# Patient Record
Sex: FEMALE | Race: WHITE
[De-identification: names, ages, dates, MRNs, and addresses within clinical notes are randomized per-mention and may not be internally consistent; named-entity substitution may affect disease eponyms.]

---

## 2017-08-11 ENCOUNTER — HOSPITAL ENCOUNTER (EMERGENCY)
Dept: HOSPITAL 47 - EC | Age: 37
LOS: 1 days | Discharge: HOME | End: 2017-08-12
Payer: COMMERCIAL

## 2017-08-11 VITALS — TEMPERATURE: 97.6 F

## 2017-08-11 VITALS — SYSTOLIC BLOOD PRESSURE: 114 MMHG | RESPIRATION RATE: 16 BRPM | DIASTOLIC BLOOD PRESSURE: 71 MMHG

## 2017-08-11 VITALS — HEART RATE: 68 BPM

## 2017-08-11 DIAGNOSIS — N83.201: Primary | ICD-10-CM

## 2017-08-11 DIAGNOSIS — Z91.040: ICD-10-CM

## 2017-08-11 DIAGNOSIS — Z90.49: ICD-10-CM

## 2017-08-11 DIAGNOSIS — Z88.0: ICD-10-CM

## 2017-08-11 DIAGNOSIS — Z88.1: ICD-10-CM

## 2017-08-11 LAB
ALP SERPL-CCNC: 65 U/L (ref 38–126)
ALT SERPL-CCNC: 44 U/L (ref 9–52)
AMYLASE SERPL-CCNC: 45 U/L (ref 30–110)
ANION GAP SERPL CALC-SCNC: 12 MMOL/L
AST SERPL-CCNC: 30 U/L (ref 14–36)
BASOPHILS # BLD AUTO: 0.1 K/UL (ref 0–0.2)
BASOPHILS NFR BLD AUTO: 1 %
BUN SERPL-SCNC: 7 MG/DL (ref 7–17)
CALCIUM SPEC-MCNC: 9.7 MG/DL (ref 8.4–10.2)
CH: 30.4
CHCM: 34.6
CHLORIDE SERPL-SCNC: 105 MMOL/L (ref 98–107)
CO2 SERPL-SCNC: 23 MMOL/L (ref 22–30)
EOSINOPHIL # BLD AUTO: 0.2 K/UL (ref 0–0.7)
EOSINOPHIL NFR BLD AUTO: 2 %
ERYTHROCYTE [DISTWIDTH] IN BLOOD BY AUTOMATED COUNT: 4.94 M/UL (ref 3.8–5.4)
ERYTHROCYTE [DISTWIDTH] IN BLOOD: 13.3 % (ref 11.5–15.5)
GLUCOSE SERPL-MCNC: 91 MG/DL (ref 74–99)
HCT VFR BLD AUTO: 43.6 % (ref 34–46)
HDW: 2.57
HGB BLD-MCNC: 14.5 GM/DL (ref 11.4–16)
LUC NFR BLD AUTO: 2 %
LYMPHOCYTES # SPEC AUTO: 3.2 K/UL (ref 1–4.8)
LYMPHOCYTES NFR SPEC AUTO: 38 %
MCH RBC QN AUTO: 29.3 PG (ref 25–35)
MCHC RBC AUTO-ENTMCNC: 33.2 G/DL (ref 31–37)
MCV RBC AUTO: 88.3 FL (ref 80–100)
MONOCYTES # BLD AUTO: 0.4 K/UL (ref 0–1)
MONOCYTES NFR BLD AUTO: 5 %
NEUTROPHILS # BLD AUTO: 4.3 K/UL (ref 1.3–7.7)
NEUTROPHILS NFR BLD AUTO: 52 %
NON-AFRICAN AMERICAN GFR(MDRD): >60
PH UR: 6.5 [PH] (ref 5–8)
POTASSIUM SERPL-SCNC: 3.7 MMOL/L (ref 3.5–5.1)
PROT SERPL-MCNC: 7.5 G/DL (ref 6.3–8.2)
SODIUM SERPL-SCNC: 140 MMOL/L (ref 137–145)
SP GR UR: 1 (ref 1–1.03)
UA BILLING (MACRO VS. MICRO): (no result)
UROBILINOGEN UR QL STRIP: <2 MG/DL (ref ?–2)
WBC # BLD AUTO: 0.17 10*3/UL
WBC # BLD AUTO: 8.3 K/UL (ref 3.8–10.6)
WBC (PEROX): 8.4

## 2017-08-11 PROCEDURE — 76830 TRANSVAGINAL US NON-OB: CPT

## 2017-08-11 PROCEDURE — 85025 COMPLETE CBC W/AUTO DIFF WBC: CPT

## 2017-08-11 PROCEDURE — 74177 CT ABD & PELVIS W/CONTRAST: CPT

## 2017-08-11 PROCEDURE — 99285 EMERGENCY DEPT VISIT HI MDM: CPT

## 2017-08-11 PROCEDURE — 93975 VASCULAR STUDY: CPT

## 2017-08-11 PROCEDURE — 83690 ASSAY OF LIPASE: CPT

## 2017-08-11 PROCEDURE — 96375 TX/PRO/DX INJ NEW DRUG ADDON: CPT

## 2017-08-11 PROCEDURE — 81003 URINALYSIS AUTO W/O SCOPE: CPT

## 2017-08-11 PROCEDURE — 82150 ASSAY OF AMYLASE: CPT

## 2017-08-11 PROCEDURE — 81025 URINE PREGNANCY TEST: CPT

## 2017-08-11 PROCEDURE — 96361 HYDRATE IV INFUSION ADD-ON: CPT

## 2017-08-11 PROCEDURE — 36415 COLL VENOUS BLD VENIPUNCTURE: CPT

## 2017-08-11 PROCEDURE — 96374 THER/PROPH/DIAG INJ IV PUSH: CPT

## 2017-08-11 PROCEDURE — 80053 COMPREHEN METABOLIC PANEL: CPT

## 2017-08-11 NOTE — CT
EXAM:

  CT Abdomen and Pelvis With Intravenous Contrast

 

CLINICAL HISTORY:

  Reason: Midline epigastric to pelvic pain

 

TECHNIQUE:

  Axial computed tomography images of the abdomen and pelvis with 

intravenous contrast.  CTDI is 37.60 mGy and DLP is 1573.40 mGy-cm.  This 

CT exam was performed using one or more of the following dose reduction 

techniques: automated exposure control, adjustment of the mA and/or kV 

according to patient size, and/or use of iterative reconstruction 

technique.

 

COMPARISON:

  None

 

FINDINGS:

  Liver: Tiny hypodensity in the liver is too small to definitively 

characterize. 

 

  Spleen: Normal. No focal lesion. 

 

  Gallbladder: Status post cholecystectomy with postcholecystectomy 

ductal ectasia.

 

  Pancreas: Normal. No mass.

 

  Adrenal glands: Normal. No mass. 

 

  Kidneys: Normal. No hydronephrosis or stone. No mass. 

 

  Bowel: Normal appendix. No bowel obstruction or inflammation.  

Nondilated fluid-filled loops of small bowel are nonspecific.

 

  Urinary bladder: Normal. No wall thickening or mass. 

 

  Reproductive organs: Small probable cyst in the right ovary measuring 3.

4 x 3.6 by 3.4 cm.

 

  Muscles: No mass.

 

  Subcutaneous tissues: Normal. 

 

  Peritoneal space: Trace fluid in the cul-de-sac. 

 

  Lymph nodes: Small mesenteric lymph nodes are nonspecific but may be 

reactive.

 

  Vessels: Normal. No aneurysm or dissection.

 

  Bones: Normal. No acute fracture or bony lesion. 

 

  Lung bases: Mild right basilar atelectasis.

 

IMPRESSION:   

  1.  Nondilated fluid-filled loops of small bowel are nonspecific.  

Enteritis cannot be excluded.  No but bowel obstruction or inflammation.

 

  2.  Normal appendix.

 

  3.  Probable small right ovarian cyst measuring up to 3.6 cm.  This 

could be further evaluated with ultrasound if clinically indicated.  

Trace free fluid in the cul-de-sac may be physiologic.

## 2017-08-11 NOTE — ED
Abdominal Pain HPI





- General


Chief Complaint: Abdominal Pain


Stated Complaint: Pelvic and back pain


Time Seen by Provider: 08/11/17 20:37


Source: patient, RN notes reviewed, old records reviewed


Mode of arrival: ambulatory


Limitations: no limitations





- History of Present Illness


Initial Comments: 





Is a 36-year-old female presenting to emergency Department chief complaint of 

right lower quadrant abdominal pain for the past day and half.  Patient reports 

it started last night.  She thought she recently had a urinary tract infection.

  She went to an urgent care after work today and she was told that her urine 

was clear and she needed to come to the emergency department.  She denies any 

fever.  She does have a history of colitis and IBS which is managed by diet.  

She reports that she had her gallbladder removed in 2004 by Dr. murphy.  She 

denies any nausea or vomiting, fevers or chills, or changes in bowel movements.

  She states that she is tender over her entire abdomen and that the pain is 

worse whenever she has to move.





- Related Data


 Home Medications











 Medication  Instructions  Recorded  Confirmed


 


Albuterol Inhaler [Ventolin Hfa 2 puff INHALATION RT-QID PRN 06/26/16 08/11/17





Inhaler]   


 


Albuterol Nebulized [Ventolin 2.5 mg INHALATION RT-QID PRN 06/26/16 08/11/17





Nebulized]   


 


Naproxen Sodium [Aleve] 220 mg PO BID PRN 08/11/17 08/11/17








 Previous Rx's











 Medication  Instructions  Recorded


 


Acetaminophen-Codeine 300-30mg 1 tab PO Q4H PRN #20 tablet 08/12/17





[Tylenol #3]  


 


Ibuprofen [Motrin] 600 mg PO Q8HR PRN #20 tab 08/12/17











 Allergies











Allergy/AdvReac Type Severity Reaction Status Date / Time


 


latex Allergy  Rash/Hives Verified 08/11/17 21:08


 


clindamycin HCl AdvReac  Rash/Hives/INCREASED Verified 08/11/17 21:08





[From Cleocin]   BP  


 


clindamycin palmitate HCl AdvReac  Rash/Hives/INCREASED Verified 08/11/17 21:08





[From Cleocin]   BP  


 


clindamycin phosphate AdvReac  Rash/Hives/INCREASED Verified 08/11/17 21:08





[From Cleocin]   BP  


 


Penicillins AdvReac  Rash/Hives/INCREASED Verified 08/11/17 21:08





   BP  














Review of Systems


ROS Statement: 


Those systems with pertinent positive or pertinent negative responses have been 

documented in the HPI.





ROS Other: All systems not noted in ROS Statement are negative.





Past Medical History


Past Medical History: Asthma, GERD/Reflux, Hyperlipidemia


Additional Past Medical History / Comment(s): MIGRAINES, IBS, ULCERATIVE COLITIS


History of Any Multi-Drug Resistant Organisms: None Reported


Past Surgical History: Adenoidectomy, Cholecystectomy, Orthopedic Surgery


Additional Past Surgical History / Comment(s): SPINAL FUSION,


Past Psychological History: Depression


Smoking Status: Never smoker


Past Alcohol Use History: None Reported


Past Drug Use History: None Reported





General Exam





- General Exam Comments


Initial Comments: 





This is a 36-year-old female.  Patient does not appear to be in any acute 

distress.


Limitations: no limitations


General appearance: alert, in no apparent distress


Head exam: Present: atraumatic, normocephalic, normal inspection


Eye exam: Present: normal appearance, PERRL, EOMI.  Absent: scleral icterus, 

conjunctival injection, periorbital swelling


ENT exam: Present: normal exam, mucous membranes moist


Neck exam: Present: normal inspection.  Absent: tenderness, meningismus, 

lymphadenopathy


Respiratory exam: Present: normal lung sounds bilaterally.  Absent: respiratory 

distress, wheezes, rales, rhonchi, stridor


Cardiovascular Exam: Present: regular rate, normal rhythm, normal heart sounds.

  Absent: systolic murmur, diastolic murmur, rubs, gallop, clicks


GI/Abdominal exam: Present: soft, tenderness (Severe right lower quadrant 

tenderness.), normal bowel sounds.  Absent: distended, guarding, rebound, rigid


Extremities exam: Present: normal inspection, full ROM, normal capillary 

refill.  Absent: tenderness, pedal edema, joint swelling, calf tenderness


Back exam: Present: normal inspection


Neurological exam: Present: alert, oriented X3, CN II-XII intact


Psychiatric exam: Present: normal affect, normal mood


Skin exam: Present: warm, dry, intact, normal color.  Absent: rash





Course


 Vital Signs











  08/11/17 08/11/17 08/11/17





  18:45 19:45 20:45


 


Temperature 97.6 F  


 


Pulse Rate 70 76 70


 


Respiratory 15 16 16





Rate   


 


Blood Pressure 132/75 115/81 112/68


 


O2 Sat by Pulse 100 100 99





Oximetry   














  08/11/17 08/11/17





  22:45 23:45


 


Temperature  


 


Pulse Rate 68 68


 


Respiratory 18 16





Rate  


 


Blood Pressure 108/55 114/71


 


O2 Sat by Pulse 68 L 98





Oximetry  














Medical Decision Making





- Medical Decision Making


Is a 36-year-old female presenting to emergency Department chief complaint of 

right lower quadrant abdominal pain for the past day and half.  Patient reports 

it started last night.  She thought she recently had a urinary tract infection.

  She went to an urgent care after work today and she was told that her urine 

was clear and she needed to come to the emergency department.  She denies any 

fever.  She does have a history of colitis and IBS which is managed by diet.  

Patient's lab work was reviewed and is negative for any acute process.  Patient 

received a CT abdomen and pelvis which showed evidence of a 3.6 cm right 

ovarian cyst, normal appendix.  There is mild free fluid within the cul-de-sac. 


Patient was reevaluated and still in pain. Patient given transvaginal US. No 

evidence of ovarian torsion, but there is the right ovarian cyst with free 

fluid noted. Patient pain is likely related to ruptured ovarian cyst. Patient 

informed of these results. Discussed follow up with PCP and patient will be 

discharged with pain medication. Return parameters discussed. 





- Lab Data


Result diagrams: 


 08/11/17 21:50





 08/11/17 21:50


 Lab Results











  08/11/17 08/11/17 08/11/17 Range/Units





  21:00 21:00 21:50 


 


WBC     (3.8-10.6)  k/uL


 


RBC     (3.80-5.40)  m/uL


 


Hgb     (11.4-16.0)  gm/dL


 


Hct     (34.0-46.0)  %


 


MCV     (80.0-100.0)  fL


 


MCH     (25.0-35.0)  pg


 


MCHC     (31.0-37.0)  g/dL


 


RDW     (11.5-15.5)  %


 


Plt Count     (150-450)  k/uL


 


Neutrophils %     %


 


Lymphocytes %     %


 


Monocytes %     %


 


Eosinophils %     %


 


Basophils %     %


 


Neutrophils #     (1.3-7.7)  k/uL


 


Lymphocytes #     (1.0-4.8)  k/uL


 


Monocytes #     (0-1.0)  k/uL


 


Eosinophils #     (0-0.7)  k/uL


 


Basophils #     (0-0.2)  k/uL


 


Sodium    140  (137-145)  mmol/L


 


Potassium    3.7  (3.5-5.1)  mmol/L


 


Chloride    105  ()  mmol/L


 


Carbon Dioxide    23  (22-30)  mmol/L


 


Anion Gap    12  mmol/L


 


BUN    7  (7-17)  mg/dL


 


Creatinine    0.60  (0.52-1.04)  mg/dL


 


Est GFR (MDRD) Af Amer    >60  (>60 ml/min/1.73 sqM)  


 


Est GFR (MDRD) Non-Af    >60  (>60 ml/min/1.73 sqM)  


 


Glucose    91  (74-99)  mg/dL


 


Calcium    9.7  (8.4-10.2)  mg/dL


 


Total Bilirubin    0.6  (0.2-1.3)  mg/dL


 


AST    30  (14-36)  U/L


 


ALT    44  (9-52)  U/L


 


Alkaline Phosphatase    65  ()  U/L


 


Total Protein    7.5  (6.3-8.2)  g/dL


 


Albumin    4.7  (3.5-5.0)  g/dL


 


Amylase    45  ()  U/L


 


Lipase    81  ()  U/L


 


Urine Color  Colorless    


 


Urine Appearance  Clear    (Clear)  


 


Urine pH  6.5    (5.0-8.0)  


 


Ur Specific Gravity  1.003    (1.001-1.035)  


 


Urine Protein  Negative    (Negative)  


 


Urine Glucose (UA)  Negative    (Negative)  


 


Urine Ketones  Negative    (Negative)  


 


Urine Blood  Negative    (Negative)  


 


Urine Nitrite  Negative    (Negative)  


 


Urine Bilirubin  Negative    (Negative)  


 


Urine Urobilinogen  <2.0    (<2.0)  mg/dL


 


Ur Leukocyte Esterase  Negative    (Negative)  


 


Urine HCG, Qual   Not Detected   (Not Detectd)  














  08/11/17 Range/Units





  21:50 


 


WBC  8.3  (3.8-10.6)  k/uL


 


RBC  4.94  (3.80-5.40)  m/uL


 


Hgb  14.5  (11.4-16.0)  gm/dL


 


Hct  43.6  (34.0-46.0)  %


 


MCV  88.3  (80.0-100.0)  fL


 


MCH  29.3  (25.0-35.0)  pg


 


MCHC  33.2  (31.0-37.0)  g/dL


 


RDW  13.3  (11.5-15.5)  %


 


Plt Count  250  (150-450)  k/uL


 


Neutrophils %  52  %


 


Lymphocytes %  38  %


 


Monocytes %  5  %


 


Eosinophils %  2  %


 


Basophils %  1  %


 


Neutrophils #  4.3  (1.3-7.7)  k/uL


 


Lymphocytes #  3.2  (1.0-4.8)  k/uL


 


Monocytes #  0.4  (0-1.0)  k/uL


 


Eosinophils #  0.2  (0-0.7)  k/uL


 


Basophils #  0.1  (0-0.2)  k/uL


 


Sodium   (137-145)  mmol/L


 


Potassium   (3.5-5.1)  mmol/L


 


Chloride   ()  mmol/L


 


Carbon Dioxide   (22-30)  mmol/L


 


Anion Gap   mmol/L


 


BUN   (7-17)  mg/dL


 


Creatinine   (0.52-1.04)  mg/dL


 


Est GFR (MDRD) Af Amer   (>60 ml/min/1.73 sqM)  


 


Est GFR (MDRD) Non-Af   (>60 ml/min/1.73 sqM)  


 


Glucose   (74-99)  mg/dL


 


Calcium   (8.4-10.2)  mg/dL


 


Total Bilirubin   (0.2-1.3)  mg/dL


 


AST   (14-36)  U/L


 


ALT   (9-52)  U/L


 


Alkaline Phosphatase   ()  U/L


 


Total Protein   (6.3-8.2)  g/dL


 


Albumin   (3.5-5.0)  g/dL


 


Amylase   ()  U/L


 


Lipase   ()  U/L


 


Urine Color   


 


Urine Appearance   (Clear)  


 


Urine pH   (5.0-8.0)  


 


Ur Specific Gravity   (1.001-1.035)  


 


Urine Protein   (Negative)  


 


Urine Glucose (UA)   (Negative)  


 


Urine Ketones   (Negative)  


 


Urine Blood   (Negative)  


 


Urine Nitrite   (Negative)  


 


Urine Bilirubin   (Negative)  


 


Urine Urobilinogen   (<2.0)  mg/dL


 


Ur Leukocyte Esterase   (Negative)  


 


Urine HCG, Qual   (Not Detectd)  














- Radiology Data


Radiology results: report reviewed


Nondilated fluid-filled loops of small bowel are nonspecific.  Enteritis cannot 

be occluded.  No evidence of bowel obstruction or inflammation.  Normal 

appendix.  Probable small right ovarian cyst measuring up to 3.6 cm.  3 further 

evaluated with ultrasound clinically indicated.  Trace fluid within the cul-de-

sac may be physiologic.





 US shows Cystic structure of the right ovary measuring 4 cm containing thin 

septations probable mild internal echoes which may represent hemorrhagic cyst.  

No evidence of torsion.  Normal uterus and left ovary.  Small amount of free 

fluid within the cul-de-sac.





Disposition


Clinical Impression: 


 Right ovarian cyst





Disposition: HOME SELF-CARE


Condition: Good


Instructions:  Ovarian Cyst (ED)


Additional Instructions: 


Is advised to follow-up with primary care provider.  Return to the emergency 

department if any alarming signs or symptoms occur.  Take pain medications as 

directed.


Prescriptions: 


Acetaminophen-Codeine 300-30mg [Tylenol #3] 1 tab PO Q4H PRN #20 tablet


 PRN Reason: Pain


Ibuprofen [Motrin] 600 mg PO Q8HR PRN #20 tab


 PRN Reason: Pain


Referrals: 


Kit Lujan MD [Primary Care Provider] - 1-2 days


Time of Disposition: 01:02

## 2017-08-12 NOTE — US
EXAM:

  US Pelvis Complete, Transabdominal

  US Pelvis, Transvaginal

 

CLINICAL HISTORY:

  Reason: Pain

 

TECHNIQUE:

  Real-time transabdominal and transvaginal pelvic ultrasound (complete) 

with image documentation.  Transvaginal imaging was used for better 

evaluation of the endometrium and adnexa.

 

COMPARISON:

  CT abdomen/pelvis on 8/11/2017

 

FINDINGS:

  Uterus: Measures 9.5 x 6.0 x 5.2 cm. Normal appearance. Endometrium 

measures 11 mm. 

 

  Right ovary: Measures 5.2 x 3.9 x 4.0 cm. Cystic structure in the right 

ovary containing thin septations and probable mild internal echoes 

measures approximately 3.8 x 4.0 x 3.3 cm and may represent a hemorrhagic 

cyst.  Normal color Doppler flow. 

 

  Left ovary: Measures 2.8 x 1.8 x 1.6 cm. Normal appearance with normal 

color Doppler flow. 

 

  Other: Small amount of free fluid in the cul-de-sac and right adnexa 

may be physiologic.

 

IMPRESSION:   

  1.  Cystic structure in the right ovary measuring up to 4 cm contains 

thin septations and probable mild internal echoes and may represent a 

hemorrhagic cyst.  No evidence of ovarian torsion.

 

  2.  Normal uterus and left ovary.

 

  3.  Small amount of free fluid in the cul-de-sac and right adnexa.

## 2017-10-06 ENCOUNTER — HOSPITAL ENCOUNTER (OUTPATIENT)
Dept: HOSPITAL 47 - RADUSWWP | Age: 37
Discharge: HOME | End: 2017-10-06
Payer: COMMERCIAL

## 2017-10-06 DIAGNOSIS — N83.201: Primary | ICD-10-CM

## 2017-10-06 PROCEDURE — 76830 TRANSVAGINAL US NON-OB: CPT

## 2017-10-06 NOTE — US
EXAMINATION TYPE: US transvaginal

 

DATE OF EXAM: 10/6/2017

 

COMPARISON: August 12, 2017

 

CLINICAL HISTORY: N83.20 previous ovarian cyst right side. F/U right ovarian cyst, pelvic pain

 

TECHNIQUE:  Transvaginal (TV)

 

Date of LMP:  09/25/2017

 

EXAM MEASUREMENTS:

 

Uterus:  9.4 x 5.5 x 5.9 cm

Endometrial Stripe: 1.1 cm

Right Ovary:  4.2 x 3.1 x 4.0 cm

Left Ovary:  2.2 x 1.3 x 1.9 cm

 

 

 

1. Uterus:  Anteverted   Heterogeneous, "bulky" in appearance

2. Endometrium:  wnl

3. Right Ovary:  Cyst with septation= 3.5 x 2.5 x 3.2 cm, different in appearance from previous

4. Left Ovary:  wnl

5. Bilateral Adnexa:  wnl

6. Posterior cul-de-sac:  Small amount of free fluid present 

 

 

 

IMPRESSION: 

1. I cannot exclude small leiomyomatous change given heterogeneity of the uterine myometrium.

2. Septated cyst right ovary. No evidence for solid lesion.

3. Free fluid within the pelvis.

## 2018-01-07 ENCOUNTER — HOSPITAL ENCOUNTER (EMERGENCY)
Dept: HOSPITAL 47 - EC | Age: 38
Discharge: HOME | End: 2018-01-07
Payer: COMMERCIAL

## 2018-01-07 VITALS — HEART RATE: 79 BPM | SYSTOLIC BLOOD PRESSURE: 94 MMHG | TEMPERATURE: 99.1 F | DIASTOLIC BLOOD PRESSURE: 49 MMHG

## 2018-01-07 VITALS — RESPIRATION RATE: 18 BRPM

## 2018-01-07 DIAGNOSIS — Z91.040: ICD-10-CM

## 2018-01-07 DIAGNOSIS — Z90.49: ICD-10-CM

## 2018-01-07 DIAGNOSIS — Z88.1: ICD-10-CM

## 2018-01-07 DIAGNOSIS — K52.9: Primary | ICD-10-CM

## 2018-01-07 DIAGNOSIS — Z88.0: ICD-10-CM

## 2018-01-07 LAB
ALBUMIN SERPL-MCNC: 4.4 G/DL (ref 3.5–5)
ALP SERPL-CCNC: 72 U/L (ref 38–126)
ALT SERPL-CCNC: 46 U/L (ref 9–52)
AMYLASE SERPL-CCNC: 50 U/L (ref 30–110)
ANION GAP SERPL CALC-SCNC: 12 MMOL/L
AST SERPL-CCNC: 28 U/L (ref 14–36)
BASOPHILS # BLD AUTO: 0 K/UL (ref 0–0.2)
BASOPHILS NFR BLD AUTO: 0 %
BUN SERPL-SCNC: 12 MG/DL (ref 7–17)
CALCIUM SPEC-MCNC: 9.6 MG/DL (ref 8.4–10.2)
CHLORIDE SERPL-SCNC: 101 MMOL/L (ref 98–107)
CO2 SERPL-SCNC: 26 MMOL/L (ref 22–30)
EOSINOPHIL # BLD AUTO: 0.1 K/UL (ref 0–0.7)
EOSINOPHIL NFR BLD AUTO: 1 %
ERYTHROCYTE [DISTWIDTH] IN BLOOD BY AUTOMATED COUNT: 4.81 M/UL (ref 3.8–5.4)
ERYTHROCYTE [DISTWIDTH] IN BLOOD: 13.4 % (ref 11.5–15.5)
GLUCOSE SERPL-MCNC: 149 MG/DL (ref 74–99)
HCT VFR BLD AUTO: 42.5 % (ref 34–46)
HGB BLD-MCNC: 13.6 GM/DL (ref 11.4–16)
LIPASE SERPL-CCNC: 73 U/L (ref 23–300)
LYMPHOCYTES # SPEC AUTO: 0.6 K/UL (ref 1–4.8)
LYMPHOCYTES NFR SPEC AUTO: 9 %
MCH RBC QN AUTO: 28.3 PG (ref 25–35)
MCHC RBC AUTO-ENTMCNC: 32.1 G/DL (ref 31–37)
MCV RBC AUTO: 88.3 FL (ref 80–100)
MONOCYTES # BLD AUTO: 0.3 K/UL (ref 0–1)
MONOCYTES NFR BLD AUTO: 4 %
NEUTROPHILS # BLD AUTO: 5.8 K/UL (ref 1.3–7.7)
NEUTROPHILS NFR BLD AUTO: 86 %
PH UR: 6 [PH] (ref 5–8)
PLATELET # BLD AUTO: 250 K/UL (ref 150–450)
POTASSIUM SERPL-SCNC: 3.9 MMOL/L (ref 3.5–5.1)
PROT SERPL-MCNC: 7.2 G/DL (ref 6.3–8.2)
PROT UR QL: (no result)
RBC UR QL: 5 /HPF (ref 0–5)
SODIUM SERPL-SCNC: 139 MMOL/L (ref 137–145)
SP GR UR: 1.02 (ref 1–1.03)
SQUAMOUS UR QL AUTO: 2 /HPF (ref 0–4)
UROBILINOGEN UR QL STRIP: <2 MG/DL (ref ?–2)
WBC # BLD AUTO: 6.8 K/UL (ref 3.8–10.6)
WBC #/AREA URNS HPF: 1 /HPF (ref 0–5)

## 2018-01-07 PROCEDURE — 96361 HYDRATE IV INFUSION ADD-ON: CPT

## 2018-01-07 PROCEDURE — 96375 TX/PRO/DX INJ NEW DRUG ADDON: CPT

## 2018-01-07 PROCEDURE — 81001 URINALYSIS AUTO W/SCOPE: CPT

## 2018-01-07 PROCEDURE — 74177 CT ABD & PELVIS W/CONTRAST: CPT

## 2018-01-07 PROCEDURE — 82150 ASSAY OF AMYLASE: CPT

## 2018-01-07 PROCEDURE — 36415 COLL VENOUS BLD VENIPUNCTURE: CPT

## 2018-01-07 PROCEDURE — 80053 COMPREHEN METABOLIC PANEL: CPT

## 2018-01-07 PROCEDURE — 83690 ASSAY OF LIPASE: CPT

## 2018-01-07 PROCEDURE — 83605 ASSAY OF LACTIC ACID: CPT

## 2018-01-07 PROCEDURE — 96374 THER/PROPH/DIAG INJ IV PUSH: CPT

## 2018-01-07 PROCEDURE — 81025 URINE PREGNANCY TEST: CPT

## 2018-01-07 PROCEDURE — 96376 TX/PRO/DX INJ SAME DRUG ADON: CPT

## 2018-01-07 PROCEDURE — 99284 EMERGENCY DEPT VISIT MOD MDM: CPT

## 2018-01-07 PROCEDURE — 85025 COMPLETE CBC W/AUTO DIFF WBC: CPT

## 2018-01-07 PROCEDURE — 87040 BLOOD CULTURE FOR BACTERIA: CPT

## 2018-01-07 NOTE — ED
Abdominal Pain HPI





- General


Chief Complaint: Abdominal Pain


Stated Complaint: Vomiting/abdominal Pain


Time Seen by Provider: 01/07/18 00:44


Source: patient, RN notes reviewed


Mode of arrival: ambulatory


Limitations: no limitations





- History of Present Illness


Initial Comments: 





This a 29-year-old female presents emergency Department chief complaint 

abdominal pain.  Patient states she developed abdominal discomfort this evening 

states that primarily her regular quadrant though she has diffuse abdominal 

pain.  She states pain is from her ovarian cyst versus been diagnosed with an 

has chronic issues.  Patient states that she's had a fever throughout the day 

long with nausea vomiting.  She does admit to some diarrhea but states is 

chronic and she states that she's been diagnosed with ulcerative colitis and 

IBS.  Patient denies any melena or hematochezia.  Patient is not taking Tylenol 

for her fever states pain is unbearable at this time.  Patient's her prior 

cholecystectomy no lower abdominal surgeries.  Denies chest pain or shortness 

of breath.





- Related Data


 Home Medications











 Medication  Instructions  Recorded  Confirmed


 


Albuterol Inhaler [Ventolin Hfa 2 puff INHALATION RT-QID PRN 06/26/16 08/11/17





Inhaler]   


 


Albuterol Nebulized [Ventolin 2.5 mg INHALATION RT-QID PRN 06/26/16 08/11/17





Nebulized]   


 


Naproxen Sodium [Aleve] 220 mg PO BID PRN 08/11/17 08/11/17








 Previous Rx's











 Medication  Instructions  Recorded


 


Acetaminophen-Codeine 300-30mg 1 tab PO Q4H PRN #20 tablet 08/12/17





[Tylenol #3]  


 


Ibuprofen [Motrin] 600 mg PO Q8HR PRN #20 tab 08/12/17


 


Acetaminophen-Codeine 300-30mg 1 tab PO Q4H PRN #20 tablet 01/07/18





[Tylenol #3]  


 


Ondansetron Odt [Zofran Odt] 4 mg PO Q8HR PRN #10 tab 01/07/18











 Allergies











Allergy/AdvReac Type Severity Reaction Status Date / Time


 


latex Allergy  Rash/Hives Verified 01/07/18 00:39


 


clindamycin HCl AdvReac  Rash/Hives/INCREASED Verified 01/07/18 00:39





[From Cleocin]   BP  


 


clindamycin palmitate HCl AdvReac  Rash/Hives/INCREASED Verified 01/07/18 00:39





[From Cleocin]   BP  


 


clindamycin phosphate AdvReac  Rash/Hives/INCREASED Verified 01/07/18 00:39





[From Cleocin]   BP  


 


Penicillins AdvReac  Rash/Hives/INCREASED Verified 01/07/18 00:39





   BP  














Review of Systems


ROS Statement: 


Those systems with pertinent positive or pertinent negative responses have been 

documented in the HPI.





ROS Other: All systems not noted in ROS Statement are negative.





Past Medical History


Past Medical History: Asthma, GERD/Reflux, Hyperlipidemia


Additional Past Medical History / Comment(s): MIGRAINES, IBS, ULCERATIVE COLITIS

, OVIARN CYST.


History of Any Multi-Drug Resistant Organisms: None Reported


Past Surgical History: Adenoidectomy, Cholecystectomy, Orthopedic Surgery


Additional Past Surgical History / Comment(s): SPINAL FUSION,


Past Psychological History: Depression


Smoking Status: Never smoker


Past Alcohol Use History: Rare


Past Drug Use History: None Reported





General Exam


Limitations: no limitations


General appearance: alert, in no apparent distress


Head exam: Present: atraumatic, normocephalic, normal inspection


Respiratory exam: Present: normal lung sounds bilaterally.  Absent: respiratory 

distress, wheezes, rales, rhonchi, stridor


Cardiovascular Exam: Present: normal rhythm, tachycardia, normal heart sounds.  

Absent: systolic murmur, diastolic murmur, rubs, gallop, clicks


GI/Abdominal exam: Present: soft, tenderness (Mild diffuse with moderate right 

lower quadrant), normal bowel sounds.  Absent: distended, guarding, rebound, 

rigid


Back exam: Absent: CVA tenderness (R), CVA tenderness (L)


Neurological exam: Present: alert, oriented X3, CN II-XII intact


Skin exam: Present: warm, dry, intact, normal color.  Absent: rash





Course


 Vital Signs











  01/07/18 01/07/18





  00:35 02:56


 


Temperature 99.8 F H 99.1 F


 


Pulse Rate 112 H 79


 


Respiratory 18 18





Rate  


 


Blood Pressure 113/69 94/49


 


O2 Sat by Pulse 97 100





Oximetry  














Medical Decision Making





- Medical Decision Making





37-year-old female delivered spur for nausea vomiting abdominal discomfort and 

fever.  Patient lab work essentially unremarkable.  Patient's CT shows diarrhea 

state, gastroenteritis.  Patient discharged Zofran.  Return parameters were 

discussed.





- Lab Data


Result diagrams: 


 01/07/18 01:25





 01/07/18 01:25


 Lab Results











  01/07/18 01/07/18 01/07/18 Range/Units





  01:25 01:25 01:25 


 


WBC   6.8   (3.8-10.6)  k/uL


 


RBC   4.81   (3.80-5.40)  m/uL


 


Hgb   13.6   (11.4-16.0)  gm/dL


 


Hct   42.5   (34.0-46.0)  %


 


MCV   88.3   (80.0-100.0)  fL


 


MCH   28.3   (25.0-35.0)  pg


 


MCHC   32.1   (31.0-37.0)  g/dL


 


RDW   13.4   (11.5-15.5)  %


 


Plt Count   250   (150-450)  k/uL


 


Neutrophils %   86   %


 


Lymphocytes %   9   %


 


Monocytes %   4   %


 


Eosinophils %   1   %


 


Basophils %   0   %


 


Neutrophils #   5.8   (1.3-7.7)  k/uL


 


Lymphocytes #   0.6 L   (1.0-4.8)  k/uL


 


Monocytes #   0.3   (0-1.0)  k/uL


 


Eosinophils #   0.1   (0-0.7)  k/uL


 


Basophils #   0.0   (0-0.2)  k/uL


 


Sodium  139    (137-145)  mmol/L


 


Potassium  3.9    (3.5-5.1)  mmol/L


 


Chloride  101    ()  mmol/L


 


Carbon Dioxide  26    (22-30)  mmol/L


 


Anion Gap  12    mmol/L


 


BUN  12    (7-17)  mg/dL


 


Creatinine  0.70    (0.52-1.04)  mg/dL


 


Est GFR (MDRD) Af Amer  >60    (>60 ml/min/1.73 sqM)  


 


Est GFR (MDRD) Non-Af  >60    (>60 ml/min/1.73 sqM)  


 


Glucose  149 H    (74-99)  mg/dL


 


Plasma Lactic Acid Juan Carlos     (0.7-2.0)  mmol/L


 


Calcium  9.6    (8.4-10.2)  mg/dL


 


Total Bilirubin  0.8    (0.2-1.3)  mg/dL


 


AST  28    (14-36)  U/L


 


ALT  46    (9-52)  U/L


 


Alkaline Phosphatase  72    ()  U/L


 


Total Protein  7.2    (6.3-8.2)  g/dL


 


Albumin  4.4    (3.5-5.0)  g/dL


 


Amylase  50    ()  U/L


 


Lipase  73    ()  U/L


 


Urine Color     


 


Urine Appearance     (Clear)  


 


Urine pH     (5.0-8.0)  


 


Ur Specific Gravity     (1.001-1.035)  


 


Urine Protein     (Negative)  


 


Urine Glucose (UA)     (Negative)  


 


Urine Ketones     (Negative)  


 


Urine Blood     (Negative)  


 


Urine Nitrite     (Negative)  


 


Urine Bilirubin     (Negative)  


 


Urine Urobilinogen     (<2.0)  mg/dL


 


Ur Leukocyte Esterase     (Negative)  


 


Urine RBC     (0-5)  /hpf


 


Urine WBC     (0-5)  /hpf


 


Ur Squamous Epith Cells     (0-4)  /hpf


 


Urine Bacteria     (None)  /hpf


 


Urine Mucus     (None)  /hpf


 


Urine HCG, Qual    Not Detected  (Not Detectd)  














  01/07/18 01/07/18 Range/Units





  01:25 01:25 


 


WBC    (3.8-10.6)  k/uL


 


RBC    (3.80-5.40)  m/uL


 


Hgb    (11.4-16.0)  gm/dL


 


Hct    (34.0-46.0)  %


 


MCV    (80.0-100.0)  fL


 


MCH    (25.0-35.0)  pg


 


MCHC    (31.0-37.0)  g/dL


 


RDW    (11.5-15.5)  %


 


Plt Count    (150-450)  k/uL


 


Neutrophils %    %


 


Lymphocytes %    %


 


Monocytes %    %


 


Eosinophils %    %


 


Basophils %    %


 


Neutrophils #    (1.3-7.7)  k/uL


 


Lymphocytes #    (1.0-4.8)  k/uL


 


Monocytes #    (0-1.0)  k/uL


 


Eosinophils #    (0-0.7)  k/uL


 


Basophils #    (0-0.2)  k/uL


 


Sodium    (137-145)  mmol/L


 


Potassium    (3.5-5.1)  mmol/L


 


Chloride    ()  mmol/L


 


Carbon Dioxide    (22-30)  mmol/L


 


Anion Gap    mmol/L


 


BUN    (7-17)  mg/dL


 


Creatinine    (0.52-1.04)  mg/dL


 


Est GFR (MDRD) Af Amer    (>60 ml/min/1.73 sqM)  


 


Est GFR (MDRD) Non-Af    (>60 ml/min/1.73 sqM)  


 


Glucose    (74-99)  mg/dL


 


Plasma Lactic Acid Juan Carlos  1.9   (0.7-2.0)  mmol/L


 


Calcium    (8.4-10.2)  mg/dL


 


Total Bilirubin    (0.2-1.3)  mg/dL


 


AST    (14-36)  U/L


 


ALT    (9-52)  U/L


 


Alkaline Phosphatase    ()  U/L


 


Total Protein    (6.3-8.2)  g/dL


 


Albumin    (3.5-5.0)  g/dL


 


Amylase    ()  U/L


 


Lipase    ()  U/L


 


Urine Color   Yellow  


 


Urine Appearance   Clear  (Clear)  


 


Urine pH   6.0  (5.0-8.0)  


 


Ur Specific Gravity   1.022  (1.001-1.035)  


 


Urine Protein   1+ H  (Negative)  


 


Urine Glucose (UA)   Negative  (Negative)  


 


Urine Ketones   Negative  (Negative)  


 


Urine Blood   Trace H  (Negative)  


 


Urine Nitrite   Negative  (Negative)  


 


Urine Bilirubin   Negative  (Negative)  


 


Urine Urobilinogen   <2.0  (<2.0)  mg/dL


 


Ur Leukocyte Esterase   Negative  (Negative)  


 


Urine RBC   5  (0-5)  /hpf


 


Urine WBC   1  (0-5)  /hpf


 


Ur Squamous Epith Cells   2  (0-4)  /hpf


 


Urine Bacteria   Rare H  (None)  /hpf


 


Urine Mucus   Many H  (None)  /hpf


 


Urine HCG, Qual    (Not Detectd)  














Disposition


Clinical Impression: 


 Gastroenteritis, Abdominal pain





Disposition: HOME SELF-CARE


Condition: Stable


Instructions:  Abdominal Pain (ED)


Additional Instructions: 


Please return to the Emergency Department if symptoms worsen or any other 

concerns.


Prescriptions: 


Acetaminophen-Codeine 300-30mg [Tylenol #3] 1 tab PO Q4H PRN #20 tablet


 PRN Reason: pain


Ondansetron Odt [Zofran Odt] 4 mg PO Q8HR PRN #10 tab


 PRN Reason: Nausea


Referrals: 


Kit Lujan MD [Primary Care Provider] - 1-2 days


Time of Disposition: 03:08

## 2018-01-07 NOTE — CT
EXAM:

  CT Abdomen and Pelvis With Intravenous Contrast

 

CLINICAL HISTORY:

  Reason: abdominal pain

 

TECHNIQUE:

  Axial computed tomography images of the abdomen and pelvis with 

intravenous contrast.  CTDI is 15.8 mGy and DLP is 1316.4  mGy-cm. This 

CT exam was performed using one or more of the following dose reduction 

techniques: automated exposure control, adjustment of the mA and/or kV 

according to patient size, and/or use of iterative reconstruction 

technique.

  Coronal and sagittal reformatted images were created and reviewed.

 

COMPARISON:

  8/11/17

 

FINDINGS:

  Lower thorax: No acute findings.

 

 ABDOMEN:

  Liver:  Unremarkable.  No mass.

  Gallbladder and bile ducts:  Cholecystectomy.  Associated mild 

prominence of the biliary ducts.

  Pancreas:  Unremarkable.  No mass.  No ductal dilation.

  Spleen:  Unremarkable.  No splenomegaly.

  Adrenals:  Unremarkable.  No mass.

  Kidneys and ureters:  Questionable 2 mm right interpolar renal stone.  

No hydronephrosis.

  Stomach and bowel:  Fluid mixed with stool in the colon, query 

diarrheal state.  High density within the dependent aspect of multiple 

small bowel loops, query recent high density ingestion.  There are 

multiple fluid distended loops of small bowel distally, nondilated.  No 

mucosal thickening.

  Appendix:  No findings to suggest acute appendicitis.

 

 PELVIS:

  Bladder:  Unremarkable.  No mass.

  Reproductive:  Small adnexal cysts, likely physiologic.  Also likely 

physiologic mild free fluid in the pelvis.  

 

 ABDOMEN and PELVIS:

  Intraperitoneal space:  No free air.

  Bones/joints:  No acute fracture.  No dislocation.

  Soft tissues:  Unremarkable.

  Vasculature:  Unremarkable.  No abdominal aortic aneurysm.

  Lymph nodes:  Unremarkable.  No enlarged lymph nodes.

  

 

IMPRESSION:     

1.  A few fluid distended loops of distal small bowel which are 

nondilated, as well as fluid mixed with stool in the colon which could 

indicate a diarrheal state.  Query gastroenteritis.

2.  Nonacute findings as above.

## 2018-01-23 ENCOUNTER — HOSPITAL ENCOUNTER (EMERGENCY)
Dept: HOSPITAL 47 - EC | Age: 38
Discharge: HOME | End: 2018-01-23
Payer: COMMERCIAL

## 2018-01-23 VITALS — SYSTOLIC BLOOD PRESSURE: 112 MMHG | DIASTOLIC BLOOD PRESSURE: 59 MMHG | TEMPERATURE: 97.9 F

## 2018-01-23 VITALS — RESPIRATION RATE: 18 BRPM

## 2018-01-23 VITALS — HEART RATE: 84 BPM

## 2018-01-23 DIAGNOSIS — J45.909: ICD-10-CM

## 2018-01-23 DIAGNOSIS — R11.2: ICD-10-CM

## 2018-01-23 DIAGNOSIS — Z79.52: ICD-10-CM

## 2018-01-23 DIAGNOSIS — Z88.5: ICD-10-CM

## 2018-01-23 DIAGNOSIS — F32.9: ICD-10-CM

## 2018-01-23 DIAGNOSIS — Z88.1: ICD-10-CM

## 2018-01-23 DIAGNOSIS — Z91.040: ICD-10-CM

## 2018-01-23 DIAGNOSIS — R19.7: ICD-10-CM

## 2018-01-23 DIAGNOSIS — Z90.49: ICD-10-CM

## 2018-01-23 DIAGNOSIS — Z79.899: ICD-10-CM

## 2018-01-23 DIAGNOSIS — R10.9: Primary | ICD-10-CM

## 2018-01-23 DIAGNOSIS — G43.909: ICD-10-CM

## 2018-01-23 DIAGNOSIS — Z79.51: ICD-10-CM

## 2018-01-23 LAB
ALBUMIN SERPL-MCNC: 5 G/DL (ref 3.5–5)
ALP SERPL-CCNC: 81 U/L (ref 38–126)
ALT SERPL-CCNC: 49 U/L (ref 9–52)
AMYLASE SERPL-CCNC: 58 U/L (ref 30–110)
ANION GAP SERPL CALC-SCNC: 16 MMOL/L
AST SERPL-CCNC: 32 U/L (ref 14–36)
BASOPHILS # BLD AUTO: 0 K/UL (ref 0–0.2)
BASOPHILS NFR BLD AUTO: 0 %
BUN SERPL-SCNC: 16 MG/DL (ref 7–17)
CALCIUM SPEC-MCNC: 10.2 MG/DL (ref 8.4–10.2)
CHLORIDE SERPL-SCNC: 102 MMOL/L (ref 98–107)
CO2 SERPL-SCNC: 23 MMOL/L (ref 22–30)
EOSINOPHIL # BLD AUTO: 0.1 K/UL (ref 0–0.7)
EOSINOPHIL NFR BLD AUTO: 1 %
ERYTHROCYTE [DISTWIDTH] IN BLOOD BY AUTOMATED COUNT: 4.95 M/UL (ref 3.8–5.4)
ERYTHROCYTE [DISTWIDTH] IN BLOOD: 12.6 % (ref 11.5–15.5)
GLUCOSE SERPL-MCNC: 101 MG/DL (ref 74–99)
HCT VFR BLD AUTO: 42.6 % (ref 34–46)
HGB BLD-MCNC: 14.7 GM/DL (ref 11.4–16)
LIPASE SERPL-CCNC: 84 U/L (ref 23–300)
LYMPHOCYTES # SPEC AUTO: 3.4 K/UL (ref 1–4.8)
LYMPHOCYTES NFR SPEC AUTO: 31 %
MCH RBC QN AUTO: 29.7 PG (ref 25–35)
MCHC RBC AUTO-ENTMCNC: 34.5 G/DL (ref 31–37)
MCV RBC AUTO: 86.1 FL (ref 80–100)
MONOCYTES # BLD AUTO: 0.5 K/UL (ref 0–1)
MONOCYTES NFR BLD AUTO: 5 %
NEUTROPHILS # BLD AUTO: 6.7 K/UL (ref 1.3–7.7)
NEUTROPHILS NFR BLD AUTO: 62 %
PH UR: 6.5 [PH] (ref 5–8)
PLATELET # BLD AUTO: 274 K/UL (ref 150–450)
POTASSIUM SERPL-SCNC: 4.1 MMOL/L (ref 3.5–5.1)
PROT SERPL-MCNC: 7.9 G/DL (ref 6.3–8.2)
SODIUM SERPL-SCNC: 141 MMOL/L (ref 137–145)
SP GR UR: 1.01 (ref 1–1.03)
UROBILINOGEN UR QL STRIP: <2 MG/DL (ref ?–2)
WBC # BLD AUTO: 10.9 K/UL (ref 3.8–10.6)

## 2018-01-23 PROCEDURE — 36415 COLL VENOUS BLD VENIPUNCTURE: CPT

## 2018-01-23 PROCEDURE — 83690 ASSAY OF LIPASE: CPT

## 2018-01-23 PROCEDURE — 82150 ASSAY OF AMYLASE: CPT

## 2018-01-23 PROCEDURE — 96374 THER/PROPH/DIAG INJ IV PUSH: CPT

## 2018-01-23 PROCEDURE — 80306 DRUG TEST PRSMV INSTRMNT: CPT

## 2018-01-23 PROCEDURE — 74019 RADEX ABDOMEN 2 VIEWS: CPT

## 2018-01-23 PROCEDURE — 96372 THER/PROPH/DIAG INJ SC/IM: CPT

## 2018-01-23 PROCEDURE — 99284 EMERGENCY DEPT VISIT MOD MDM: CPT

## 2018-01-23 PROCEDURE — 80320 DRUG SCREEN QUANTALCOHOLS: CPT

## 2018-01-23 PROCEDURE — 85025 COMPLETE CBC W/AUTO DIFF WBC: CPT

## 2018-01-23 PROCEDURE — 87040 BLOOD CULTURE FOR BACTERIA: CPT

## 2018-01-23 PROCEDURE — 83605 ASSAY OF LACTIC ACID: CPT

## 2018-01-23 PROCEDURE — 87086 URINE CULTURE/COLONY COUNT: CPT

## 2018-01-23 PROCEDURE — 81003 URINALYSIS AUTO W/O SCOPE: CPT

## 2018-01-23 PROCEDURE — 96361 HYDRATE IV INFUSION ADD-ON: CPT

## 2018-01-23 PROCEDURE — 80053 COMPREHEN METABOLIC PANEL: CPT

## 2018-01-23 PROCEDURE — 96375 TX/PRO/DX INJ NEW DRUG ADDON: CPT

## 2018-01-23 NOTE — XR
EXAMINATION TYPE: XR abdomen 2V

 

DATE OF EXAM: 1/23/2018

 

CLINICAL HISTORY: Abdominal pain and nausea. Recent weight loss.

 

TECHNIQUE: Supine and upright views of the abdomen are obtained.

 

COMPARISON:  CT abdomen and pelvis January 7, 2018

 

FINDINGS: Gas is seen in nondistended stomach. Scattered gas is seen in non-distended small bowel loo
ps.  Gas and fecal material is seen in non-distended colon. Gas is seen in nondistended rectum. Pat
cystectomy clips are redemonstrated. Lung bases are clear. No pneumoperitoneum or suspicious calcific
ations are present. Visualized osseous structures are intact.

 

 

IMPRESSION:  Overall nonobstructive bowel gas pattern.

## 2018-01-23 NOTE — ED
General Adult HPI





- General


Source: patient, RN notes reviewed


Mode of arrival: ambulatory


Limitations: no limitations





<Reina Hall - Last Filed: 01/23/18 20:35>





<Ian Hinson - Last Filed: 01/23/18 22:02>





- General


Chief complaint: Abdominal Pain


Stated complaint: Vomiting/depression


Time Seen by Provider: 01/23/18 18:33





- History of Present Illness


Initial comments: 





38 yo female presents to the ER with cc of nausea vomiting and diarrhea.  She 

states she's been sick for the last week or so.  She states that she is just 

tired of being sick.  She started to develop some depression and has thought 

about taking all of her Xanax due to this.  She called her doctor her doctor 

with unable to see her.  She is tried her medications at home without much 

improvement.  She denies any blood in the stool.  She denies any high fever or 

chills.  They were concerned due to her continued abdominal pain and nausea 

vomiting she states that she feels as if she's lost weight she is unable to 

keep anything down so she thought she should be seen. Patient denies any recent 

fever, chills, shortness of breath, chest pain, back pain, numbness or tingling

, dysuria or hematuria, constipation or diarrhea, headaches or visual changes, 

or any other current symptoms. (Reina Hall)





- Related Data


 Home Medications











 Medication  Instructions  Recorded  Confirmed


 


Albuterol Inhaler [Ventolin Hfa 2 puff INHALATION RT-QID PRN 06/26/16 01/23/18





Inhaler]   


 


Levofloxacin [Levaquin] 500 mg PO DAILY 01/23/18 01/23/18


 


Mometasone/Formoterol [Dulera 100 2 puff INHALATION RT-BID 01/23/18 01/23/18





Mcg/5 Mcg Inhaler]   


 


Topiramate [Topamax] 50 mg PO BID 01/23/18 01/23/18


 


predniSONE See Taper PO DAILY 01/23/18 01/23/18








 Previous Rx's











 Medication  Instructions  Recorded


 


Acetaminophen-Codeine 300-30mg 1 tab PO Q4H PRN #20 tablet 01/07/18





[Tylenol #3]  


 


Ondansetron Odt [Zofran ODT] 4 mg PO Q8HR PRN #20 tab 01/23/18











 Allergies











Allergy/AdvReac Type Severity Reaction Status Date / Time


 


latex Allergy  Rash/Hives Verified 01/23/18 18:48


 


morphine Allergy  Abdominal Verified 01/23/18 18:48





   Pain  


 


clindamycin HCl AdvReac  Rash/Hives/INCREASED Verified 01/23/18 18:48





[From Cleocin]   BP  


 


clindamycin palmitate HCl AdvReac  Rash/Hives/INCREASED Verified 01/23/18 18:48





[From Cleocin]   BP  


 


clindamycin phosphate AdvReac  Rash/Hives/INCREASED Verified 01/23/18 18:48





[From Cleocin]   BP  


 


Penicillins AdvReac  Rash/Hives/INCREASED Verified 01/23/18 18:48





   BP  














Review of Systems


ROS Other: All systems not noted in ROS Statement are negative.





<Reina Hall - Last Filed: 01/23/18 20:35>


ROS Other: All systems not noted in ROS Statement are negative.





<Ian Hinson - Last Filed: 01/23/18 22:02>


ROS Statement: 


Those systems with pertinent positive or pertinent negative responses have been 

documented in the HPI.








Past Medical History


Past Medical History: Asthma, GERD/Reflux, Hyperlipidemia


Additional Past Medical History / Comment(s): MIGRAINES, IBS, ULCERATIVE COLITIS

, OVIARN CYST.


History of Any Multi-Drug Resistant Organisms: None Reported


Past Surgical History: Adenoidectomy, Cholecystectomy, Orthopedic Surgery


Additional Past Surgical History / Comment(s): SPINAL FUSION,


Past Psychological History: Depression


Smoking Status: Never smoker


Past Alcohol Use History: Rare


Past Drug Use History: None Reported





<Reina Hall - Last Filed: 01/23/18 20:35>





General Exam


Limitations: no limitations





<Reina Hall - Last Filed: 01/23/18 20:35>





<Ian Hinson - Last Filed: 01/23/18 22:02>





- General Exam Comments


Initial Comments: 





General:  The patient is awake and alert, in no distress, and does not appear 

acutely ill. 


Eye:  Pupils are equal, round and reactive to light, extra-ocular movements are 

intact; there is normal conjunctiva bilaterally.  No signs of icterus.  


Ears, nose, mouth and throat:  There are moist mucous membranes and no oral 

lesions. 


Neck:  The neck is supple, there is no tenderness.


Cardiovascular:  There is a regular rate and rhythm. No murmur, rub or gallop 

is appreciated.


Respiratory:  Lungs are clear to auscultation, respirations are non-labored, 

breath sounds are equal.  No wheezes, stridor, rales, or rhonchi.


Gastrointestinal:  Soft, non-distended, non-tender abdomen without masses or 

organomegaly noted. There is no rebound or guarding present.  No CVA 

tenderness. Bowel sounds are unremarkable.


Back:  There is no tenderness to palpation in the midline. There is no obvious 

deformity. No rashes noted. 


Musculoskeletal:  Normal ROM, no tenderness, There is no pedal edema. There is 

no calf tenderness or swelling. Sensation intact. Pulses equal bilaterally 2+.  


Neurological:  CN II-XII intact, There are no obvious motor or sensory 

deficits. Coordination appears grossly intact. Speech is normal.


Skin:  Skin is warm and dry and no rashes or lesions are noted. 


Psychiatric:  Cooperative, patient does express suicidal ideation with a plan 

to overdose on her Xanax.


 (Reina Hall)





Course





<Renia Hall - Last Filed: 01/23/18 20:35>





<Ian Hinson - Last Filed: 01/23/18 22:02>





 Vital Signs











  01/23/18 01/23/18 01/23/18





  18:33 19:26 21:39


 


Temperature 97.2 F L 99.4 F 97.9 F


 


Pulse Rate 84 84 84


 


Respiratory 16 18 18





Rate   


 


Blood Pressure 137/76 125/80 112/59


 


O2 Sat by Pulse 98 98 95





Oximetry   














- Reevaluation(s)


Reevaluation #1: 





01/23/18 20:35


This case will be signed out to DR. Hinson. (Reina Hall)





Medical Decision Making





- Lab Data


Result diagrams: 


 01/23/18 19:25





 01/23/18 19:25





<Reina Hall - Last Filed: 01/23/18 20:35>





- Lab Data


Result diagrams: 


 01/23/18 19:25





 01/23/18 19:25





<Ian Hinson - Last Filed: 01/23/18 22:02>





- Medical Decision Making





37-year-old female presents for continued nausea at this time.  Patient admits 

to diarrhea as well.  She had CAT scan done 2 weeks ago that showed suspicion 

for gastroenteritis.  Lab work was repeated at this time patient is mildly 

elevated white blood cell count however she currently is on steroids.  This 

time laboratory otherwise is stable.  X-rays reviewed that does not show any 

acute process.  At this time we discussed that she will be started on Reglan 

for home health for the nausea and close follow-up with her doctor.  We 

discussed continuing her current medication regimen.  Due to the fact that she 

has expressive suicidal ideation.  We will have the patient seen by psychiatry 

here for further evaluation. (Reina Hall)





The patient was seen by the psychiatric team.  The case is discussed with the 

psychiatry nurse.  She is no longer feeling suicidal.  They feel as though she 

stable for discharge.  She we will give the patient some outpatient follow-up 

resources and she subsequently discharged. (Ian Hinson)





- Lab Data





 Lab Results











  01/23/18 01/23/18 01/23/18 Range/Units





  19:25 19:25 19:25 


 


WBC   10.9 H   (3.8-10.6)  k/uL


 


RBC   4.95   (3.80-5.40)  m/uL


 


Hgb   14.7   (11.4-16.0)  gm/dL


 


Hct   42.6   (34.0-46.0)  %


 


MCV   86.1   (80.0-100.0)  fL


 


MCH   29.7   (25.0-35.0)  pg


 


MCHC   34.5   (31.0-37.0)  g/dL


 


RDW   12.6   (11.5-15.5)  %


 


Plt Count   274   (150-450)  k/uL


 


Neutrophils %   62   %


 


Lymphocytes %   31   %


 


Monocytes %   5   %


 


Eosinophils %   1   %


 


Basophils %   0   %


 


Neutrophils #   6.7   (1.3-7.7)  k/uL


 


Lymphocytes #   3.4   (1.0-4.8)  k/uL


 


Monocytes #   0.5   (0-1.0)  k/uL


 


Eosinophils #   0.1   (0-0.7)  k/uL


 


Basophils #   0.0   (0-0.2)  k/uL


 


Sodium  141    (137-145)  mmol/L


 


Potassium  4.1    (3.5-5.1)  mmol/L


 


Chloride  102    ()  mmol/L


 


Carbon Dioxide  23    (22-30)  mmol/L


 


Anion Gap  16    mmol/L


 


BUN  16    (7-17)  mg/dL


 


Creatinine  0.80    (0.52-1.04)  mg/dL


 


Est GFR (MDRD) Af Amer  >60    (>60 ml/min/1.73 sqM)  


 


Est GFR (MDRD) Non-Af  >60    (>60 ml/min/1.73 sqM)  


 


Glucose  101 H    (74-99)  mg/dL


 


Plasma Lactic Acid Juan Carlos    1.2  (0.7-2.0)  mmol/L


 


Calcium  10.2    (8.4-10.2)  mg/dL


 


Total Bilirubin  0.8    (0.2-1.3)  mg/dL


 


AST  32    (14-36)  U/L


 


ALT  49    (9-52)  U/L


 


Alkaline Phosphatase  81    ()  U/L


 


Total Protein  7.9    (6.3-8.2)  g/dL


 


Albumin  5.0    (3.5-5.0)  g/dL


 


Amylase  58    ()  U/L


 


Lipase  84    ()  U/L


 


Urine Color     


 


Urine Appearance     (Clear)  


 


Urine pH     (5.0-8.0)  


 


Ur Specific Gravity     (1.001-1.035)  


 


Urine Protein     (Negative)  


 


Urine Glucose (UA)     (Negative)  


 


Urine Ketones     (Negative)  


 


Urine Blood     (Negative)  


 


Urine Nitrite     (Negative)  


 


Urine Bilirubin     (Negative)  


 


Urine Urobilinogen     (<2.0)  mg/dL


 


Ur Leukocyte Esterase     (Negative)  


 


Urine Opiates Screen     (NotDetected)  


 


Ur Oxycodone Screen     (NotDetected)  


 


Urine Methadone Screen     (NotDetected)  


 


Ur Propoxyphene Screen     (NotDetected)  


 


Ur Barbiturates Screen     (NotDetected)  


 


U Tricyclic Antidepress     (NotDetected)  


 


Ur Phencyclidine Scrn     (NotDetected)  


 


Ur Amphetamines Screen     (NotDetected)  


 


U Methamphetamines Scrn     (NotDetected)  


 


U Benzodiazepines Scrn     (NotDetected)  


 


Urine Cocaine Screen     (NotDetected)  


 


U Marijuana (THC) Screen     (NotDetected)  


 


Serum Alcohol  <10    mg/dL














  01/23/18 Range/Units





  19:29 


 


WBC   (3.8-10.6)  k/uL


 


RBC   (3.80-5.40)  m/uL


 


Hgb   (11.4-16.0)  gm/dL


 


Hct   (34.0-46.0)  %


 


MCV   (80.0-100.0)  fL


 


MCH   (25.0-35.0)  pg


 


MCHC   (31.0-37.0)  g/dL


 


RDW   (11.5-15.5)  %


 


Plt Count   (150-450)  k/uL


 


Neutrophils %   %


 


Lymphocytes %   %


 


Monocytes %   %


 


Eosinophils %   %


 


Basophils %   %


 


Neutrophils #   (1.3-7.7)  k/uL


 


Lymphocytes #   (1.0-4.8)  k/uL


 


Monocytes #   (0-1.0)  k/uL


 


Eosinophils #   (0-0.7)  k/uL


 


Basophils #   (0-0.2)  k/uL


 


Sodium   (137-145)  mmol/L


 


Potassium   (3.5-5.1)  mmol/L


 


Chloride   ()  mmol/L


 


Carbon Dioxide   (22-30)  mmol/L


 


Anion Gap   mmol/L


 


BUN   (7-17)  mg/dL


 


Creatinine   (0.52-1.04)  mg/dL


 


Est GFR (MDRD) Af Amer   (>60 ml/min/1.73 sqM)  


 


Est GFR (MDRD) Non-Af   (>60 ml/min/1.73 sqM)  


 


Glucose   (74-99)  mg/dL


 


Plasma Lactic Acid Juan Carlos   (0.7-2.0)  mmol/L


 


Calcium   (8.4-10.2)  mg/dL


 


Total Bilirubin   (0.2-1.3)  mg/dL


 


AST   (14-36)  U/L


 


ALT   (9-52)  U/L


 


Alkaline Phosphatase   ()  U/L


 


Total Protein   (6.3-8.2)  g/dL


 


Albumin   (3.5-5.0)  g/dL


 


Amylase   ()  U/L


 


Lipase   ()  U/L


 


Urine Color  Yellow  


 


Urine Appearance  Clear  (Clear)  


 


Urine pH  6.5  (5.0-8.0)  


 


Ur Specific Gravity  1.012  (1.001-1.035)  


 


Urine Protein  Negative  (Negative)  


 


Urine Glucose (UA)  Negative  (Negative)  


 


Urine Ketones  Negative  (Negative)  


 


Urine Blood  Negative  (Negative)  


 


Urine Nitrite  Negative  (Negative)  


 


Urine Bilirubin  Negative  (Negative)  


 


Urine Urobilinogen  <2.0  (<2.0)  mg/dL


 


Ur Leukocyte Esterase  Negative  (Negative)  


 


Urine Opiates Screen  Not Detected  (NotDetected)  


 


Ur Oxycodone Screen  Not Detected  (NotDetected)  


 


Urine Methadone Screen  Not Detected  (NotDetected)  


 


Ur Propoxyphene Screen  Not Detected  (NotDetected)  


 


Ur Barbiturates Screen  Not Detected  (NotDetected)  


 


U Tricyclic Antidepress  Not Detected  (NotDetected)  


 


Ur Phencyclidine Scrn  Not Detected  (NotDetected)  


 


Ur Amphetamines Screen  Not Detected  (NotDetected)  


 


U Methamphetamines Scrn  Not Detected  (NotDetected)  


 


U Benzodiazepines Scrn  Detected H  (NotDetected)  


 


Urine Cocaine Screen  Not Detected  (NotDetected)  


 


U Marijuana (THC) Screen  Not Detected  (NotDetected)  


 


Serum Alcohol   mg/dL














Disposition





<Reina Hall - Last Filed: 01/23/18 20:35>


Time of Disposition: 22:01





<Ian Hinson - Last Filed: 01/23/18 22:02>


Clinical Impression: 


 Nausea and vomiting, Abdominal pain, Depressed





Disposition: HOME SELF-CARE


Condition: Good


Instructions:  Abdominal Pain (ED), Acute Nausea and Vomiting (ED), Depression (

ED)


Prescriptions: 


Ondansetron Odt [Zofran ODT] 4 mg PO Q8HR PRN #20 tab


 PRN Reason: Nausea


Referrals: 


Kit Lujan MD [Primary Care Provider] - 1-2 days

## 2018-02-28 ENCOUNTER — HOSPITAL ENCOUNTER (OUTPATIENT)
Dept: HOSPITAL 47 - LABWHC1 | Age: 38
Discharge: HOME | End: 2018-02-28
Payer: COMMERCIAL

## 2018-02-28 DIAGNOSIS — K52.9: Primary | ICD-10-CM

## 2018-02-28 PROCEDURE — 86140 C-REACTIVE PROTEIN: CPT

## 2018-02-28 PROCEDURE — 85652 RBC SED RATE AUTOMATED: CPT

## 2018-02-28 PROCEDURE — 83516 IMMUNOASSAY NONANTIBODY: CPT

## 2018-02-28 PROCEDURE — 36415 COLL VENOUS BLD VENIPUNCTURE: CPT

## 2018-03-01 LAB — GLIADIN IGA SER-ACNC: <0.2 U/ML

## 2018-03-16 ENCOUNTER — HOSPITAL ENCOUNTER (OUTPATIENT)
Dept: HOSPITAL 47 - ORWHC2ENDO | Age: 38
Discharge: HOME | End: 2018-03-16
Payer: COMMERCIAL

## 2018-03-16 VITALS — BODY MASS INDEX: 31.6 KG/M2

## 2018-03-16 VITALS — HEART RATE: 57 BPM | SYSTOLIC BLOOD PRESSURE: 105 MMHG | DIASTOLIC BLOOD PRESSURE: 66 MMHG

## 2018-03-16 VITALS — RESPIRATION RATE: 16 BRPM | TEMPERATURE: 97.6 F

## 2018-03-16 DIAGNOSIS — Z88.1: ICD-10-CM

## 2018-03-16 DIAGNOSIS — Z88.0: ICD-10-CM

## 2018-03-16 DIAGNOSIS — Z91.040: ICD-10-CM

## 2018-03-16 DIAGNOSIS — Z91.010: ICD-10-CM

## 2018-03-16 DIAGNOSIS — Z88.5: ICD-10-CM

## 2018-03-16 DIAGNOSIS — Z79.899: ICD-10-CM

## 2018-03-16 DIAGNOSIS — R19.7: Primary | ICD-10-CM

## 2018-03-16 DIAGNOSIS — J45.909: ICD-10-CM

## 2018-03-16 PROCEDURE — 88305 TISSUE EXAM BY PATHOLOGIST: CPT

## 2018-03-16 PROCEDURE — 81025 URINE PREGNANCY TEST: CPT

## 2018-03-16 PROCEDURE — 45380 COLONOSCOPY AND BIOPSY: CPT

## 2018-03-16 NOTE — P.PCN
Date of Procedure: 03/16/18


Procedure(s) Performed: 


BRIEF HISTORY: Patient is a 37-year-old pleasant , white female scheduled for 

an elective colonoscopy as a part of the abdominal pain, diarrhea for the last 

2 months duration.  Patient says that she was diagnosed with ulcerative colitis 

in 2005.  Recently she was treated with antibiotics and steroids and platelet 

with no help.  She lost 20 pounds since onset of the symptoms.  She is hence 

scheduled for colonoscopy to evaluate further.





PROCEDURE PERFORMED: Colonoscopy with random biopsies. 





PREOPERATIVE DIAGNOSIS: Diarrhea of 2 months duration. 





IV sedation per Anesthesia. 





PROCEDURE: After informed consent was obtained, the patient, was brought into 

the endoscopy unit. IV sedation was administered by Anesthesia under continuous 

monitoring.  Digital rectal examination was normal. Initially the Olympus CF-

160 flexible video colonoscope was then inserted in the rectum, gradually 

advanced into the cecum without any difficulty. Careful examination was 

performed as the scope was gradually being withdrawn. Ileocecal valve and the 

appendiceal orifice were visualized and appeared normal.  Prep was excellent.  

Terminal ileum appeared normal.  Biopsies were done from the terminal ileum.  

Mucosa of the cecum, ascending colon, transverse colon, descending colon, 

sigmoid colon, and rectum appeared normal.  Random biopsies were done from 

ascending and descending colon to rule out microscopic/collagenous colitis.  

Retroflexion was performed in the rectum and no lesions were seen. The patient 

tolerated the procedure well. 





IMPRESSION: 


Normal-appearing colon from rectum to cecum with no evidence of colitis or 

colorectal neoplasia





RECOMMENDATIONS:  


Findings of this examination were discussed with the patient as well as a 

family.  She was advised to follow with the biopsy results.  She will be seen 

in the office in 2 weeks.

## 2020-10-09 ENCOUNTER — HOSPITAL ENCOUNTER (OUTPATIENT)
Dept: HOSPITAL 47 - RAD | Age: 40
Discharge: HOME | End: 2020-10-09
Attending: FAMILY MEDICINE
Payer: COMMERCIAL

## 2020-10-09 DIAGNOSIS — M54.5: Primary | ICD-10-CM

## 2020-10-09 PROCEDURE — 72100 X-RAY EXAM L-S SPINE 2/3 VWS: CPT

## 2020-10-09 NOTE — XR
EXAM TYPE: LUMBAR SPINE X RAY SERIES

 

COMPARISON: 11/11/2014

 

HISTORY: Pain

 

TECHNIQUE: 4 views are submitted.

 

FINDINGS:

Alignment is anatomic.  The pedicles are intact.  The transverse processes are intact.  There is no s
pondylolysis or spondylolisthesis.  Punctate calcifications overlying the kidneys.

 

IMPRESSION:

1. No acute process.  If symptoms persist consider MRI.

2. Punctate calcifications overlying the kidney suspicious for renal calculus.

## 2021-01-29 ENCOUNTER — HOSPITAL ENCOUNTER (OUTPATIENT)
Dept: HOSPITAL 47 - RADXRMAIN | Age: 41
Discharge: HOME | End: 2021-01-29
Attending: FAMILY MEDICINE
Payer: COMMERCIAL

## 2021-01-29 DIAGNOSIS — M25.532: Primary | ICD-10-CM

## 2021-01-29 NOTE — XR
Left wrist

 

HISTORY: Left wrist pain

 

4 views the left wrist

 

Bone mineralization, joint spaces and alignment are maintained. No fracture or dislocation.

 

IMPRESSION: No abnormality evident to account for patient's symptoms. Consider wrist MRI as indicated
.

## 2021-04-05 ENCOUNTER — HOSPITAL ENCOUNTER (OUTPATIENT)
Dept: HOSPITAL 47 - LABWHC1 | Age: 41
Discharge: HOME | End: 2021-04-05
Attending: NEUROLOGICAL SURGERY
Payer: COMMERCIAL

## 2021-04-05 DIAGNOSIS — M96.1: Primary | ICD-10-CM

## 2021-04-05 LAB
ALBUMIN SERPL-MCNC: 4.5 G/DL (ref 3.8–4.9)
ALBUMIN/GLOB SERPL: 2.5 G/DL (ref 1.6–3.17)
ALP SERPL-CCNC: 86 U/L (ref 41–126)
ALT SERPL-CCNC: 20 U/L (ref 8–44)
ANION GAP SERPL CALC-SCNC: 9 MMOL/L (ref 4–12)
APTT BLD: 22 SEC (ref 22–30)
AST SERPL-CCNC: 20 U/L (ref 13–35)
BASOPHILS # BLD AUTO: 0.05 X 10*3/UL (ref 0–0.1)
BASOPHILS NFR BLD AUTO: 0.9 %
BUN SERPL-SCNC: 11 MG/DL (ref 9–27)
BUN/CREAT SERPL: 18.33 RATIO (ref 12–20)
CALCIUM SPEC-MCNC: 9.9 MG/DL (ref 8.7–10.3)
CHLORIDE SERPL-SCNC: 106 MMOL/L (ref 96–109)
CO2 SERPL-SCNC: 24 MMOL/L (ref 21.6–31.8)
EOSINOPHIL # BLD AUTO: 0.17 X 10*3/UL (ref 0.04–0.35)
EOSINOPHIL NFR BLD AUTO: 3 %
ERYTHROCYTE [DISTWIDTH] IN BLOOD BY AUTOMATED COUNT: 4.28 X 10*6/UL (ref 4.1–5.2)
ERYTHROCYTE [DISTWIDTH] IN BLOOD: 12.8 % (ref 11.5–14.5)
GLOBULIN SER CALC-MCNC: 1.8 G/DL (ref 1.6–3.3)
GLUCOSE SERPL-MCNC: 117 MG/DL (ref 70–110)
HCT VFR BLD AUTO: 38.6 % (ref 37.2–46.3)
HGB BLD-MCNC: 12.3 G/DL (ref 12–15)
INR PPP: 0.9 (ref ?–1.2)
LYMPHOCYTES # SPEC AUTO: 2.4 X 10*3/UL (ref 0.9–5)
LYMPHOCYTES NFR SPEC AUTO: 42.2 %
MCH RBC QN AUTO: 28.7 PG (ref 27–32)
MCHC RBC AUTO-ENTMCNC: 31.9 G/DL (ref 32–37)
MCV RBC AUTO: 90.2 FL (ref 80–97)
MONOCYTES # BLD AUTO: 0.47 X 10*3/UL (ref 0.2–1)
MONOCYTES NFR BLD AUTO: 8.3 %
NEUTROPHILS # BLD AUTO: 2.59 X 10*3/UL (ref 1.8–7.7)
NEUTROPHILS NFR BLD AUTO: 45.4 %
PH UR: 5 [PH] (ref 5–8)
PLATELET # BLD AUTO: 276 X 10*3/UL (ref 140–440)
POTASSIUM SERPL-SCNC: 4.1 MMOL/L (ref 3.5–5.5)
PROT SERPL-MCNC: 6.3 G/DL (ref 6.2–8.2)
PT BLD: 9.6 SEC (ref 9–12)
SODIUM SERPL-SCNC: 139 MMOL/L (ref 135–145)
SP GR UR: 1 (ref 1–1.03)
UROBILINOGEN UR QL STRIP: <2 MG/DL (ref ?–2)
WBC # BLD AUTO: 5.69 X 10*3/UL (ref 4.5–10)

## 2021-04-05 PROCEDURE — 80053 COMPREHEN METABOLIC PANEL: CPT

## 2021-04-05 PROCEDURE — 81003 URINALYSIS AUTO W/O SCOPE: CPT

## 2021-04-05 PROCEDURE — 85730 THROMBOPLASTIN TIME PARTIAL: CPT

## 2021-04-05 PROCEDURE — 85610 PROTHROMBIN TIME: CPT

## 2021-04-05 PROCEDURE — 36415 COLL VENOUS BLD VENIPUNCTURE: CPT

## 2021-04-05 PROCEDURE — 85025 COMPLETE CBC W/AUTO DIFF WBC: CPT

## 2022-06-03 ENCOUNTER — HOSPITAL ENCOUNTER (OUTPATIENT)
Dept: HOSPITAL 47 - RADMRIMAIN | Age: 42
Discharge: HOME | End: 2022-06-03
Payer: COMMERCIAL

## 2022-06-03 DIAGNOSIS — Z53.9: Primary | ICD-10-CM

## 2022-08-20 ENCOUNTER — HOSPITAL ENCOUNTER (EMERGENCY)
Dept: HOSPITAL 47 - EC | Age: 42
Discharge: HOME | End: 2022-08-20
Payer: COMMERCIAL

## 2022-08-20 VITALS — TEMPERATURE: 98.4 F

## 2022-08-20 VITALS — RESPIRATION RATE: 20 BRPM

## 2022-08-20 VITALS — HEART RATE: 79 BPM | DIASTOLIC BLOOD PRESSURE: 68 MMHG | SYSTOLIC BLOOD PRESSURE: 125 MMHG

## 2022-08-20 DIAGNOSIS — Z88.6: ICD-10-CM

## 2022-08-20 DIAGNOSIS — R09.89: Primary | ICD-10-CM

## 2022-08-20 DIAGNOSIS — Z88.0: ICD-10-CM

## 2022-08-20 DIAGNOSIS — Z91.040: ICD-10-CM

## 2022-08-20 DIAGNOSIS — F32.A: ICD-10-CM

## 2022-08-20 DIAGNOSIS — E78.5: ICD-10-CM

## 2022-08-20 DIAGNOSIS — F41.9: ICD-10-CM

## 2022-08-20 DIAGNOSIS — Z91.010: ICD-10-CM

## 2022-08-20 DIAGNOSIS — Z79.51: ICD-10-CM

## 2022-08-20 DIAGNOSIS — J45.909: ICD-10-CM

## 2022-08-20 DIAGNOSIS — K21.9: ICD-10-CM

## 2022-08-20 DIAGNOSIS — Z88.5: ICD-10-CM

## 2022-08-20 DIAGNOSIS — Z88.1: ICD-10-CM

## 2022-08-20 DIAGNOSIS — Z79.899: ICD-10-CM

## 2022-08-20 DIAGNOSIS — M19.90: ICD-10-CM

## 2022-08-20 PROCEDURE — 99284 EMERGENCY DEPT VISIT MOD MDM: CPT

## 2022-08-20 PROCEDURE — 71046 X-RAY EXAM CHEST 2 VIEWS: CPT

## 2022-08-20 NOTE — XR
EXAMINATION TYPE: XR chest 2V

 

DATE OF EXAM: 8/20/2022

 

COMPARISON: 9/5/2012

 

HISTORY: Cough

 

TECHNIQUE: 2 views

 

FINDINGS: Heart and mediastinum are normal. Lungs are clear. Diaphragm is normal. Bony thorax is inta
ct.

 

IMPRESSION: Normal chest. No change.

## 2022-08-20 NOTE — ED
General Adult HPI





- General


Chief complaint: Upper Respiratory Infection


Stated complaint: MARY, choking spell


Time Seen by Provider: 08/20/22 04:14


Source: patient


Mode of arrival: ambulatory





- History of Present Illness


Initial comments: 





Patient is 41-year-old woman who presents with cough that is developed over the 

past day and also had a gagging episode on her secretions.  She is concerned she

may have aspirated.


-: hour(s)


Location: chest


Severity scale (1-10): 0


Consistency: constant


Worsens with: none


Associated Symptoms: cough





- Related Data


                                Home Medications











 Medication  Instructions  Recorded  Confirmed


 


Albuterol Inhaler [Ventolin Hfa 2 puff INHALATION QID PRN 06/26/16 03/16/18





Inhaler]   


 


Mometasone/Formoterol [Dulera 100 2 puff INHALATION BID 01/23/18 03/16/18





Mcg/5 Mcg Inhaler]   


 


Topiramate [Topamax] 50 mg PO BID 01/23/18 03/16/18


 


Dicyclomine [Bentyl] 10 mg PO QID 03/15/18 03/16/18


 


Sertraline [Zoloft] 50 mg PO DAILY 03/15/18 03/16/18








                                  Previous Rx's











 Medication  Instructions  Recorded


 


Ondansetron Odt [Zofran ODT] 4 mg PO Q8HR PRN #20 tab 01/23/18











                                    Allergies











Allergy/AdvReac Type Severity Reaction Status Date / Time


 


codeine Allergy  Unknown Verified 08/20/22 04:04


 


latex Allergy  Rash/Hives Verified 08/20/22 04:04


 


morphine Allergy  Abdominal Verified 08/20/22 04:04





   Pain  


 


peanut Allergy  Anaphylaxis Verified 08/20/22 04:04


 


vancomycin Allergy  Unknown Verified 08/20/22 04:04


 


clindamycin HCl AdvReac  Rash/Hives/INCREASED Verified 08/20/22 04:04





[From Cleocin]   BP  


 


clindamycin palmitate HCl AdvReac  Rash/Hives/INCREASED Verified 08/20/22 04:04





[From Cleocin]   BP  


 


clindamycin phosphate AdvReac  Rash/Hives/INCREASED Verified 08/20/22 04:04





[From Cleocin]   BP  


 


Penicillins AdvReac  Rash/Hives/INCREASED Verified 08/20/22 04:04





   BP  














Review of Systems


ROS Statement: 


Those systems with pertinent positive or pertinent negative responses have been 

documented in the HPI.





ROS Other: All systems not noted in ROS Statement are negative.


Constitutional: Denies: fever, chills


Respiratory: Reports: cough, dyspnea


Cardiovascular: Reports: chest pain.  Denies: palpitations, dyspnea on exertion


Gastrointestinal: Denies: abdominal pain, vomiting, diarrhea


Musculoskeletal: Denies: back pain


Skin: Denies: rash


Neurological: Denies: headache





Past Medical History


Past Medical History: Asthma, GERD/Reflux, Hyperlipidemia, Osteoarthritis (OA)


Additional Past Medical History / Comment(s): MIGRAINES, IBS, ULCERATIVE 

COLITIS, OVIARN CYST.


History of Any Multi-Drug Resistant Organisms: None Reported


Past Surgical History: Adenoidectomy, Back Surgery, Cholecystectomy, Orthopedic 

Surgery


Additional Past Surgical History / Comment(s): SPINAL FUSION, left carpal tunnel

, left knee arthroscopy,uvulpalatopharyngoplasty


Past Anesthesia/Blood Transfusion Reactions: Previous Problems w/ Anesthesia, 

Motion Sickness


Additional Past Anesthesia/Blood Transfusion Reaction / Comment(s): very 

emotional after anesthesia


Past Psychological History: Anxiety, Depression


Smoking Status: Never smoker


Past Alcohol Use History: Rare


Past Drug Use History: None Reported





- Past Family History


  ** Mother


Family Medical History: No Reported History





General Exam


General appearance: alert, in no apparent distress


Head exam: Present: atraumatic, normocephalic


Eye exam: Present: normal appearance.  Absent: scleral icterus, conjunctival 

injection


Neck exam: Present: normal inspection


Respiratory exam: Present: normal lung sounds bilaterally.  Absent: respiratory 

distress, wheezes, rales, rhonchi, stridor


Cardiovascular Exam: Present: regular rate, normal rhythm, normal heart sounds. 

Absent: systolic murmur, diastolic murmur, rubs, gallop


GI/Abdominal exam: Present: soft.  Absent: distended, tenderness, guarding, 

rebound, rigid


Extremities exam: Present: normal inspection, normal capillary refill.  Absent: 

pedal edema, calf tenderness


Neurological exam: Present: alert


Skin exam: Present: warm, dry, intact, normal color.  Absent: rash





Course


                                   Vital Signs











  08/20/22 08/20/22 08/20/22





  03:59 04:20 05:41


 


Temperature 98.4 F  


 


Pulse Rate 107 H 88 79


 


Respiratory 18 20 20





Rate   


 


Blood Pressure 144/90  125/68


 


O2 Sat by Pulse 98 97 98





Oximetry   














Disposition


Clinical Impression: 


 Choking episode





Disposition: HOME SELF-CARE


Condition: Good


Instructions (If sedation given, give patient instructions):  Acute Cough (ED)


Is patient prescribed a controlled substance at d/c from ED?: No


Referrals: 


Kit Lujan MD [Primary Care Provider] - 1-2 days

## 2022-09-30 ENCOUNTER — HOSPITAL ENCOUNTER (OUTPATIENT)
Dept: HOSPITAL 47 - ORWHC2ENDO | Age: 42
Discharge: HOME | End: 2022-09-30
Attending: INTERNAL MEDICINE
Payer: COMMERCIAL

## 2022-09-30 VITALS — RESPIRATION RATE: 16 BRPM | TEMPERATURE: 97.5 F

## 2022-09-30 VITALS — DIASTOLIC BLOOD PRESSURE: 72 MMHG | SYSTOLIC BLOOD PRESSURE: 109 MMHG

## 2022-09-30 VITALS — HEART RATE: 72 BPM

## 2022-09-30 DIAGNOSIS — K25.9: ICD-10-CM

## 2022-09-30 DIAGNOSIS — M79.7: ICD-10-CM

## 2022-09-30 DIAGNOSIS — K29.50: Primary | ICD-10-CM

## 2022-09-30 DIAGNOSIS — Z79.890: ICD-10-CM

## 2022-09-30 DIAGNOSIS — G47.33: ICD-10-CM

## 2022-09-30 DIAGNOSIS — J45.909: ICD-10-CM

## 2022-09-30 DIAGNOSIS — Z79.899: ICD-10-CM

## 2022-09-30 DIAGNOSIS — K21.00: ICD-10-CM

## 2022-09-30 DIAGNOSIS — E78.5: ICD-10-CM

## 2022-09-30 PROCEDURE — 43239 EGD BIOPSY SINGLE/MULTIPLE: CPT

## 2022-09-30 PROCEDURE — 81025 URINE PREGNANCY TEST: CPT

## 2022-09-30 PROCEDURE — 88305 TISSUE EXAM BY PATHOLOGIST: CPT

## 2022-09-30 RX ADMIN — POTASSIUM CHLORIDE SCH MLS: 14.9 INJECTION, SOLUTION INTRAVENOUS at 13:27

## 2022-09-30 RX ADMIN — POTASSIUM CHLORIDE SCH MLS: 14.9 INJECTION, SOLUTION INTRAVENOUS at 12:55

## 2022-09-30 NOTE — P.PCN
Date of Procedure: 09/30/22


Procedure(s) Performed: 


BRIEF HISTORY: Patient is a 41-year-old, pleasant, scheduled for an upper 

endoscopy as a part of evaluation of intermittent dysphagia to solids and long-

standing history of GERD





PROCEDURE PERFORMED: Esophagogastroduodenoscopy with biopsy .





PREOPERATIVE DIAGNOSIS: GERD and intermittent dysphagia to solids


IV sedation per anesthesia. 





PROCEDURE: After informed consent was obtained, the patient  was brought into 

the endoscopy unit. IV sedation was administered by Anesthesia under continuous 

monitoring. Initially the Olympus GIF-140 video endoscope was inserted into the 

mouth. Esophagus intubated without any difficulty. It was gradually advanced 

into the stomach and duodenum and carefully examined. The bulb and the second 

part of the duodenum appeared normal. The scope at this time was withdrawn to 

the stomach, adequately insufflated with air, and upon careful examination, 

mucosa of the antrum, mild gastritis and biopsies were done from this area.  The

 body, cardia and the fundus appeared normal. The scope was then withdrawn into 

the esophagus. The GE junction was located at 39 cm from the incthere were 

linear erosions in the distal esophagus consistent with LA grade B reflux 

esophagitis.  The rest of esophagus appeared normal.  There was no evidence of 

esophageal stricture.  Patient tolerated the procedure well.





IMPRESSION: 


1.  Linear erosions in the distal esophagus consistent with LA grade B reflux 

esophagitis.


2.  No evidence of esophageal stricture.


3.  Mild antral gastritis





RECOMMENDATIONS: The findings of this examination were discussed with the 

patient as well as her family.  She was advised to continue with Protonix 40 mg 

daily and follow antireflux..

## 2022-12-27 ENCOUNTER — HOSPITAL ENCOUNTER (OUTPATIENT)
Dept: HOSPITAL 47 - RADMAMWWP | Age: 42
Discharge: HOME | End: 2022-12-27
Attending: FAMILY MEDICINE
Payer: COMMERCIAL

## 2022-12-27 DIAGNOSIS — Z12.31: Primary | ICD-10-CM

## 2022-12-27 DIAGNOSIS — Z80.3: ICD-10-CM

## 2022-12-27 PROCEDURE — 77067 SCR MAMMO BI INCL CAD: CPT

## 2022-12-28 NOTE — MM
Reason for Exam: Screening  (asymptomatic). 

Baseline mammogram.





Patient History: 

Menarche at age 12. First Full-Term Pregnancy at age 23.

Paternal aunt had breast cancer. Paternal cousin had breast cancer.

Last menstrual period: 12/20/2022





Risk Values: 

Hillary 5 year model risk: 0.6%.

NCI Lifetime model risk: 8.8%.





Prior Study Comparison: 

Patient's first Mammogram. No prior studies available for comparison. 





Tissue Density: 

The breast tissue is extremely dense which could obscure a lesion on mammography.





Findings: 

Analyzed By CAD. 

Pattern appears symmetrical.

No suspicious groups of microcalcifications, spiculated or lobular masses, architectural distortion

or other secondary signs of malignancy are mammographically apparent. 





Overall Assessment: Negative, BI-RAD 1





Management: 

Screening Mammogram of both breasts in 1 year.

A negative mammogram report should not preclude additional follow up of suspicious palpable

abnormalities.

Patient should continue monthly self breast exam.

A clinical breast exam by your physician is recommended on an annual basis and results should be

correlated with mammographic findings.



Electronically signed and approved by: Jimmy Kessler D.O. Radiologis

## 2024-02-07 ENCOUNTER — HOSPITAL ENCOUNTER (OUTPATIENT)
Dept: HOSPITAL 47 - RADCTMAIN | Age: 44
Discharge: HOME | End: 2024-02-07
Attending: PAIN MEDICINE
Payer: COMMERCIAL

## 2024-02-07 DIAGNOSIS — M54.12: Primary | ICD-10-CM

## 2024-02-07 PROCEDURE — 72125 CT NECK SPINE W/O DYE: CPT

## 2024-02-07 NOTE — CT
EXAMINATION TYPE: CT cervical spine wo con

 

DATE OF EXAM: 2/7/2024

 

COMPARISON: None

 

HISTORY: cervical spine pain

 

CT DLP: 575 mGycm

 

Unenhanced CT of the cervical spine was performed with bone and soft tissue window settings submitted
.  Coronal and sagittal reconstruction is obtained.

 

C2-3: Within normal limits

 

C3-4: Within normal limits

 

C4-5: Within normal limits

 

C5-6: Within normal limits

 

C6-7: Mild ventral spurring. Minimal degenerative disc space narrowing. No significant disc bulge or 
herniation. No central stenosis. Foramina are patent.

 

C7-T1: Within normal limits

 

No evidence of fracture or malalignment. Prevertebral soft tissues are within normal limits. Lung api
tamir are clear.

 

IMPRESSION:

1. Minimal degenerative narrowing at C6-7. Otherwise unremarkable study.

## 2024-09-30 ENCOUNTER — HOSPITAL ENCOUNTER (OUTPATIENT)
Dept: HOSPITAL 47 - RADXRMAIN | Age: 44
Discharge: HOME | End: 2024-09-30
Attending: FAMILY MEDICINE
Payer: COMMERCIAL

## 2024-09-30 DIAGNOSIS — M25.562: Primary | ICD-10-CM

## 2024-09-30 NOTE — XR
EXAMINATION TYPE: XR knee complete LT

 

DATE OF EXAM: 9/30/2024 10:13 AM

 

CLINICAL INDICATION: Female, 43 years old with history of R52 pain; PH[facility]

 

COMPARISON: None.

 

TECHNIQUE: XR knee complete LT; examined in Frontal, lateral and oblique projections.

 

FINDINGS:   No evidence of any acute osseous pathology, soft tissue swelling, or joint effusion is no
unique.

 

IMPRESSION: 

No acute osseous pathology.

 

[facility], Workstation: [id number], 9/30/2024 10:18 AM

## 2025-06-12 ENCOUNTER — HOSPITAL ENCOUNTER (OUTPATIENT)
Dept: HOSPITAL 47 - RADMAMWWP | Age: 45
Discharge: HOME | End: 2025-06-12
Attending: FAMILY MEDICINE
Payer: COMMERCIAL

## 2025-06-12 DIAGNOSIS — R92.343: ICD-10-CM

## 2025-06-12 DIAGNOSIS — Z80.3: ICD-10-CM

## 2025-06-12 DIAGNOSIS — Z12.31: Primary | ICD-10-CM

## 2025-06-12 PROCEDURE — 77067 SCR MAMMO BI INCL CAD: CPT
